# Patient Record
Sex: FEMALE | NOT HISPANIC OR LATINO | Employment: FULL TIME | URBAN - METROPOLITAN AREA
[De-identification: names, ages, dates, MRNs, and addresses within clinical notes are randomized per-mention and may not be internally consistent; named-entity substitution may affect disease eponyms.]

---

## 2023-11-21 ENCOUNTER — PHARMACY VISIT (OUTPATIENT)
Dept: PHARMACY | Facility: CLINIC | Age: 58
End: 2023-11-21
Payer: COMMERCIAL

## 2023-11-21 PROCEDURE — RXMED WILLOW AMBULATORY MEDICATION CHARGE

## 2024-01-17 ENCOUNTER — APPOINTMENT (OUTPATIENT)
Dept: RADIOLOGY | Facility: HOSPITAL | Age: 59
End: 2024-01-17
Payer: COMMERCIAL

## 2024-01-17 ENCOUNTER — HOSPITAL ENCOUNTER (EMERGENCY)
Facility: HOSPITAL | Age: 59
Discharge: HOME | End: 2024-01-17
Payer: COMMERCIAL

## 2024-01-17 VITALS
TEMPERATURE: 97.9 F | DIASTOLIC BLOOD PRESSURE: 88 MMHG | WEIGHT: 200 LBS | SYSTOLIC BLOOD PRESSURE: 151 MMHG | RESPIRATION RATE: 16 BRPM | HEIGHT: 61 IN | BODY MASS INDEX: 37.76 KG/M2 | OXYGEN SATURATION: 99 % | HEART RATE: 82 BPM

## 2024-01-17 DIAGNOSIS — Z76.0 MEDICATION REFILL: ICD-10-CM

## 2024-01-17 DIAGNOSIS — M54.50 ACUTE MIDLINE LOW BACK PAIN WITHOUT SCIATICA: Primary | ICD-10-CM

## 2024-01-17 PROBLEM — M47.812 SPONDYLOSIS OF CERVICAL SPINE: Status: ACTIVE | Noted: 2017-06-14

## 2024-01-17 PROBLEM — K58.9 IRRITABLE BOWEL SYNDROME: Status: ACTIVE | Noted: 2024-01-17

## 2024-01-17 PROBLEM — Z98.1 S/P LUMBAR SPINAL FUSION: Status: ACTIVE | Noted: 2019-07-17

## 2024-01-17 PROBLEM — H25.11 NUCLEAR SENILE CATARACT OF RIGHT EYE: Status: ACTIVE | Noted: 2022-05-10

## 2024-01-17 PROBLEM — E11.9 DM TYPE 2 (DIABETES MELLITUS, TYPE 2) (MULTI): Status: ACTIVE | Noted: 2024-01-17

## 2024-01-17 PROBLEM — F90.0 ATTENTION DEFICIT HYPERACTIVITY DISORDER (ADHD), PREDOMINANTLY INATTENTIVE TYPE: Status: ACTIVE | Noted: 2023-07-13

## 2024-01-17 PROBLEM — K51.90 ULCERATIVE COLITIS (MULTI): Status: ACTIVE | Noted: 2024-01-17

## 2024-01-17 PROBLEM — I10 ESSENTIAL HYPERTENSION: Status: ACTIVE | Noted: 2024-01-17

## 2024-01-17 PROCEDURE — 72131 CT LUMBAR SPINE W/O DYE: CPT

## 2024-01-17 PROCEDURE — 96372 THER/PROPH/DIAG INJ SC/IM: CPT

## 2024-01-17 PROCEDURE — 2500000004 HC RX 250 GENERAL PHARMACY W/ HCPCS (ALT 636 FOR OP/ED): Performed by: PHYSICIAN ASSISTANT

## 2024-01-17 PROCEDURE — 72131 CT LUMBAR SPINE W/O DYE: CPT | Performed by: RADIOLOGY

## 2024-01-17 PROCEDURE — 99284 EMERGENCY DEPT VISIT MOD MDM: CPT

## 2024-01-17 PROCEDURE — 2500000001 HC RX 250 WO HCPCS SELF ADMINISTERED DRUGS (ALT 637 FOR MEDICARE OP): Performed by: PHYSICIAN ASSISTANT

## 2024-01-17 RX ORDER — CYCLOBENZAPRINE HCL 10 MG
10 TABLET ORAL EVERY 8 HOURS PRN
COMMUNITY
Start: 2023-08-09 | End: 2024-01-17 | Stop reason: SDUPTHER

## 2024-01-17 RX ORDER — ESTRADIOL 0.1 MG/G
1 CREAM VAGINAL
COMMUNITY
Start: 2022-04-25

## 2024-01-17 RX ORDER — HYDROCODONE BITARTRATE AND ACETAMINOPHEN 5; 325 MG/1; MG/1
1 TABLET ORAL ONCE
Status: COMPLETED | OUTPATIENT
Start: 2024-01-17 | End: 2024-01-17

## 2024-01-17 RX ORDER — LEVOTHYROXINE SODIUM 150 UG/1
150 TABLET ORAL
Qty: 30 TABLET | Refills: 0 | Status: SHIPPED | OUTPATIENT
Start: 2024-01-17

## 2024-01-17 RX ORDER — FOLIC ACID 1 MG/1
1 TABLET ORAL
COMMUNITY
Start: 2023-07-05

## 2024-01-17 RX ORDER — DEXTROAMPHETAMINE SACCHARATE, AMPHETAMINE ASPARTATE, DEXTROAMPHETAMINE SULFATE AND AMPHETAMINE SULFATE 3.75; 3.75; 3.75; 3.75 MG/1; MG/1; MG/1; MG/1
15 TABLET ORAL 2 TIMES DAILY
COMMUNITY
Start: 2024-01-16 | End: 2024-02-15

## 2024-01-17 RX ORDER — CYCLOBENZAPRINE HCL 10 MG
10 TABLET ORAL 3 TIMES DAILY PRN
Qty: 15 TABLET | Refills: 0 | Status: SHIPPED | OUTPATIENT
Start: 2024-01-17

## 2024-01-17 RX ORDER — HYDROCODONE BITARTRATE AND ACETAMINOPHEN 5; 325 MG/1; MG/1
1 TABLET ORAL EVERY 6 HOURS PRN
Qty: 10 TABLET | Refills: 0 | Status: SHIPPED | OUTPATIENT
Start: 2024-01-17

## 2024-01-17 RX ORDER — OMEPRAZOLE 20 MG/1
20 CAPSULE, DELAYED RELEASE ORAL
COMMUNITY
Start: 2023-08-09

## 2024-01-17 RX ORDER — KETOROLAC TROMETHAMINE 30 MG/ML
30 INJECTION, SOLUTION INTRAMUSCULAR; INTRAVENOUS ONCE
Status: COMPLETED | OUTPATIENT
Start: 2024-01-17 | End: 2024-01-17

## 2024-01-17 RX ORDER — PREDNISONE 5 MG/1
5 TABLET ORAL
COMMUNITY
Start: 2024-01-16 | End: 2024-01-17 | Stop reason: SDUPTHER

## 2024-01-17 RX ORDER — FUROSEMIDE 40 MG/1
40 TABLET ORAL
COMMUNITY
Start: 2023-08-09

## 2024-01-17 RX ORDER — DIFLUPREDNATE OPHTHALMIC 0.5 MG/ML
1 EMULSION OPHTHALMIC 4 TIMES DAILY
COMMUNITY
Start: 2023-08-09

## 2024-01-17 RX ORDER — IBUPROFEN 800 MG/1
800 TABLET ORAL EVERY 8 HOURS PRN
COMMUNITY
Start: 2023-08-09

## 2024-01-17 RX ORDER — METFORMIN HYDROCHLORIDE 1000 MG/1
1 TABLET ORAL
COMMUNITY
Start: 2023-08-09

## 2024-01-17 RX ORDER — LISINOPRIL 20 MG/1
20 TABLET ORAL
COMMUNITY
Start: 2023-08-09

## 2024-01-17 RX ORDER — KETOROLAC TROMETHAMINE 30 MG/ML
INJECTION, SOLUTION INTRAMUSCULAR; INTRAVENOUS
Status: DISCONTINUED
Start: 2024-01-17 | End: 2024-01-17 | Stop reason: HOSPADM

## 2024-01-17 RX ORDER — PREDNISONE 20 MG/1
20 TABLET ORAL ONCE
Status: COMPLETED | OUTPATIENT
Start: 2024-01-17 | End: 2024-01-17

## 2024-01-17 RX ORDER — LEVOTHYROXINE SODIUM 137 UG/1
137 TABLET ORAL
COMMUNITY
Start: 2023-08-09 | End: 2024-01-17 | Stop reason: SDUPTHER

## 2024-01-17 RX ORDER — GABAPENTIN 800 MG/1
800 TABLET ORAL 3 TIMES DAILY
COMMUNITY
Start: 2023-08-09 | End: 2024-02-05

## 2024-01-17 RX ORDER — PREDNISONE 20 MG/1
20 TABLET ORAL DAILY
Qty: 4 TABLET | Refills: 0 | Status: SHIPPED | OUTPATIENT
Start: 2024-01-17

## 2024-01-17 RX ORDER — PREDNISONE 5 MG/1
5 TABLET ORAL
Qty: 30 TABLET | Refills: 0 | Status: SHIPPED | OUTPATIENT
Start: 2024-01-17

## 2024-01-17 RX ADMIN — KETOROLAC TROMETHAMINE 30 MG: 30 INJECTION INTRAMUSCULAR; INTRAVENOUS at 10:30

## 2024-01-17 RX ADMIN — PREDNISONE 20 MG: 20 TABLET ORAL at 12:48

## 2024-01-17 RX ADMIN — HYDROCODONE BITARTRATE AND ACETAMINOPHEN 1 TABLET: 5; 325 TABLET ORAL at 15:30

## 2024-01-17 ASSESSMENT — PAIN DESCRIPTION - ORIENTATION: ORIENTATION: LOWER

## 2024-01-17 ASSESSMENT — COLUMBIA-SUICIDE SEVERITY RATING SCALE - C-SSRS
1. IN THE PAST MONTH, HAVE YOU WISHED YOU WERE DEAD OR WISHED YOU COULD GO TO SLEEP AND NOT WAKE UP?: NO
6. HAVE YOU EVER DONE ANYTHING, STARTED TO DO ANYTHING, OR PREPARED TO DO ANYTHING TO END YOUR LIFE?: NO
2. HAVE YOU ACTUALLY HAD ANY THOUGHTS OF KILLING YOURSELF?: NO

## 2024-01-17 ASSESSMENT — PAIN DESCRIPTION - LOCATION: LOCATION: BACK

## 2024-01-17 ASSESSMENT — PAIN - FUNCTIONAL ASSESSMENT
PAIN_FUNCTIONAL_ASSESSMENT: 0-10
PAIN_FUNCTIONAL_ASSESSMENT: 0-10

## 2024-01-17 ASSESSMENT — PAIN SCALES - GENERAL
PAINLEVEL_OUTOF10: 8
PAINLEVEL_OUTOF10: 2

## 2024-01-17 NOTE — ED PROVIDER NOTES
"Chief Complaint   Patient presents with    Back Pain     HPI:   Ashley Martinez is an 58 y.o. female complicated PMH that includes ADHD, T2DM, HTN, IBS, spondylolysis of cervical spine, spinal fusion (2019) who presents to the ED for evaluation of back pain x 5 days.  She localizes it to her bilateral low back around the level of L5-S1.  Radiates down both legs.  She denies trauma or injury, abnormal bending, lifting or twisting.  She rates the pain 8/10.  Aggravated by movement.  Relieved by Motrin and by using a walker and holding weight on the walker.  Took 600 mg of ibuprofen at 0630.  She says it \"feels like quick electric jabs\".  She says it similar to when she had sciatica but she has never had sciatica down both legs before.  She endorses slight tingling sensation.  Denies any numbness, loss of sensation, weakness, loss of bowel or bladder, urinary retention, saddle anesthesia, fevers, history of IVDA.  She is diabetic.  Last time she had back surgery was in 2019.  Has not had any recent injections.  She denies any upper back pain, leg pain.  Patient does note that she takes 5 mg prednisone daily and has been out of it for the past few days.  She is not sure if that is causing exacerbation.    Medications: Adderall, Flexeril, folic acid, Lasix, gabapentin, Synthroid, lisinopril, metformin, omeprazole, prednisone, Zofran  Soc HX: works for [employer]   Allergies   Allergen Reactions    Penicillins Anaphylaxis    Codeine Nausea/vomiting   :  History reviewed. No pertinent past medical history.  History reviewed. No pertinent surgical history.  No family history on file.     Physical Exam  Vitals and nursing note reviewed.   Constitutional:       General: She is not in acute distress.     Appearance: She is not ill-appearing or toxic-appearing.      Comments: Pleasant.  Elevated BMI   HENT:      Head: Normocephalic and atraumatic.      Right Ear: External ear normal.      Left Ear: External ear normal.   Eyes: "      Pupils: Pupils are equal, round, and reactive to light.   Cardiovascular:      Rate and Rhythm: Normal rate and regular rhythm.      Pulses: Normal pulses.      Heart sounds: No murmur heard.  Pulmonary:      Effort: Pulmonary effort is normal. No respiratory distress.      Breath sounds: Normal breath sounds.   Abdominal:      General: Bowel sounds are normal.      Palpations: Abdomen is soft.      Tenderness: There is no abdominal tenderness. There is no guarding or rebound.   Musculoskeletal:         General: No deformity or signs of injury. Normal range of motion.      Cervical back: Normal range of motion. No tenderness.      Comments: 5/5 strength bilateral lower extremities   Skin:     General: Skin is warm and dry.      Capillary Refill: Capillary refill takes less than 2 seconds.      Findings: No bruising or rash.   Neurological:      General: No focal deficit present.      Mental Status: She is alert.      Cranial Nerves: No cranial nerve deficit.      Sensory: No sensory deficit.      Coordination: Coordination normal.      Gait: Gait abnormal (Ambulating with walker).      Deep Tendon Reflexes: Reflexes normal.      Comments: Negative straight leg raise bilaterally     VS: As documented in the triage note and EMR flowsheet from this visit were reviewed.    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      Medical Decision Making:   ED Course as of 01/17/24 1713   Wed Jan 17, 2024   1017 Vitals Reviewed: Afebrile. Hypertensive. Not tachycardic nor tachypneic. No hypoxia.   [KA]   1129 Patient is well-appearing 58-year-old female who presents to the ED for evaluation of atraumatic low back pain.  She is walking with a walker which she says is new for her.   She has normal high-sensitivity neuroexam aside from rectal tone which was not assessed.  Do not suspect cauda equina or spinal epidural abscess that would necessitate urgent MRI imaging.  Patient requesting a shot of Toradol.  Will provide Toradol and 20 mg prednisone.  Will undergo CT of the lumbar spine without contrast.  She is concerned hardware might be loose.  She does have point tenderness around the paraspinal musculature around the level of L5-S1. [KA]   1136 RN called CT to inquire about reason for delay. No answer [KA]   1202 Called CT x 2 to inquire as to reason for delay. No answer. [KA]   1250 RN notified me that one CT is down and they have limited staffing. Issue has been escalated to managment [KA]   1345 Patient updated on reason for delay.  There are for patients ahead of her.  She is aware of the wait time.  She does state pain has improved.  She is comfortable waiting until CT is available. [KA]   1515 Paged Neuro surg [KA]   1522 Discussed CT findings with patient.  She says that she does feel better following the prednisone.  Toradol helped but not as much is prednisone.  She still complaining of pain.  Would like a Vicodin.  Will order this for her.  I did advise her that I spoke page neurosurgery.  It is unlikely that they will recommend MRI imaging today however I did tell her that is the reason I paged them. [KA]   1539 Repaged neuro [KA]   1640 3rd page neurosurg   [KA]   1707 Patient reports feeling better following [place] administration.  We have not yet heard back from neurosurgery.  I had shared decision-making conversation with the patient and she is agreeable to be discharged.  I have placed consult order for neurosurgery so the patient could be scheduled for neurosurgical follow-up.  We discussed strict return precautions including any loss of bowel or bladder, urinary retention, saddle anesthesia, any changes in pain.  Patient says she will return immediately to the ER should she  notice anything.  Reviewed OARRS.  No concerning findings.  Will send a couple of days of [place] to the pharmacy.  Have advised that she should not take this with Flexeril as it can decrease respiratory drive.  She is agreeable. [KA]      ED Course User Index  [KA] Gely Alvarado PA-C         Diagnoses as of 01/17/24 1713   Acute midline low back pain without sciatica   Medication refill      Escalation of Care: Appropriate for outpatient management     Prescription Drug Consideration: One-time courtesy refill prednisone, Synthroid and Flexeril      Discussion of Management with Other Providers:  I discussed the patient/results with: Attending Angelo, although he did not personally evaluate patient himself.    Counseling: Spoke with the patient and discussed today´s findings, in addition to providing specific details for the plan of care and expected course.  Patient was given the opportunity to ask questions.    Discussed return precautions and importance of follow-up.  Advised to follow-up with orthopedics/spine.  Advised to return to the ED for changing or worsening symptoms, new symptoms, complaint specific precautions, and precautions listed on the discharge paperwork.  Educated on the common potential side effects of medications prescribed.    I advised the patient that the emergency evaluation and treatment provided today doesn't end their need for medical care. It is very important that they follow-up with their primary care provider or other specialist as instructed.    The plan of care was mutually agreed upon with the patient. The patient and/or family were given the opportunity to ask questions. All questions asked today in the ED were answered to the best of my ability with today's information.    I specifically advised the patient to return to the ED for changing or worsening symptoms, worrisome new symptoms, or for any complaint specific precautions listed on the discharge paperwork.    This patient  was cared for in the setting of nationwide stress on resources and staffing.    This report was transcribed using voice recognition software.  Every effort was made to ensure accuracy, however, inadvertently computerized transcription errors may be present.       Gely Alvarado PA-C  01/17/24 3685

## 2024-01-17 NOTE — DISCHARGE INSTRUCTIONS
Please follow-up with neurosurgery.  Follow-up with primary care provider in the next couple of days.  Continue supportive care including rest, hydration, NSAIDs, Tylenol, heat and or ice.  Return to nearest ER for any new or worsening symptoms including incontinence, loss of sensation, numbness or tingling or anything else concerning to you.

## 2024-01-17 NOTE — ED TRIAGE NOTES
"C/O LOWER BACK PAIN RADIATING INTO BLE, PT STATES \"I HAD BACK SURGERY 2019 L2-S1, I DON'T KNOW IF A SCREW HAS MOVED OR NOT\" DENIES KNOWN INJURY, DENIES CP/SOB/PALPITATIONS/N/V/D/F/CHILLS, AMBULATED TO TRIAGE WITH USE OF WALKER, PT STATES \"I DO NOT NORMALLY USE A WALKER, BUT I HAD TO TODAY\" DENIES DYSURIA/HEMATURIA/HEMATOCHEZIA, ONSET 5 DAYS   "

## 2024-01-23 ENCOUNTER — OFFICE VISIT (OUTPATIENT)
Dept: NEUROSURGERY | Facility: CLINIC | Age: 59
End: 2024-01-23
Payer: COMMERCIAL

## 2024-01-23 VITALS
SYSTOLIC BLOOD PRESSURE: 131 MMHG | BODY MASS INDEX: 39.27 KG/M2 | HEIGHT: 60 IN | DIASTOLIC BLOOD PRESSURE: 84 MMHG | HEART RATE: 101 BPM | TEMPERATURE: 95.5 F | WEIGHT: 200 LBS

## 2024-01-23 DIAGNOSIS — M54.9 BACK PAIN, UNSPECIFIED BACK LOCATION, UNSPECIFIED BACK PAIN LATERALITY, UNSPECIFIED CHRONICITY: Primary | ICD-10-CM

## 2024-01-23 PROCEDURE — 3075F SYST BP GE 130 - 139MM HG: CPT | Performed by: NEUROLOGICAL SURGERY

## 2024-01-23 PROCEDURE — 4010F ACE/ARB THERAPY RXD/TAKEN: CPT | Performed by: NEUROLOGICAL SURGERY

## 2024-01-23 PROCEDURE — 3079F DIAST BP 80-89 MM HG: CPT | Performed by: NEUROLOGICAL SURGERY

## 2024-01-23 PROCEDURE — 99213 OFFICE O/P EST LOW 20 MIN: CPT | Mod: GC | Performed by: NEUROLOGICAL SURGERY

## 2024-01-23 PROCEDURE — 99203 OFFICE O/P NEW LOW 30 MIN: CPT | Performed by: NEUROLOGICAL SURGERY

## 2024-01-23 ASSESSMENT — PAIN SCALES - GENERAL: PAINLEVEL: 6

## 2024-01-23 NOTE — PROGRESS NOTES
It was a pleasure to see Ms. Martinez at the Neurosurgery Spine Clinic at [facility].     Patient is a 58-year-old female who underwent a prior anterior cervical corpectomy and L4-S1 fusion at the [facility] who presents with 1 week of tailbone pain complaints with no radicular issues in her legs.  She reports no significant trauma.  She states that she had very good relief from her issues after her prior surgery at the [facility].  She denies any bowel or bladder issues.    The pain is 7/10. The pain is described as pinching, sharp, and stabbing and occurs all day.  Symptoms are exacerbated by walking for more than 5 minutes. Factors which relieve the pain include NSAIDs and rest      Numbness and/or tingling - NO    Weakness : YES BOTH LEGS    Bladder/Bowel symptoms - NO    The patient has tried medications (eg: gabapentin, NSAIDS and narcotics ) : Yes  PT : No  Pain Management with ESIs/selective nerve blocks  - NO    she is a NON-SMOKER and DIABETIC    History of Depression : NO    PRIOR SPINE SURGERY: YES    Denies use of Aspirin, Coumadin, or Plavix or any other anticoagulants NO    NARCOTICS for pain : YES VICODEN PRESCRIPTION GIVEN IN ER    Part of this patient’s history is from personal review of the patient’s previous charts.      No past medical history on file.        Current Outpatient Medications:     amphetamine-dextroamphetamine (Adderall) 15 mg tablet, Take 1 tablet (15 mg) by mouth twice a day., Disp: , Rfl:     cyclobenzaprine (Flexeril) 10 mg tablet, Take 1 tablet (10 mg) by mouth 3 times a day as needed for muscle spasms., Disp: 15 tablet, Rfl: 0    difluprednate (Durezol) 0.05 % ophthalmic solution, Administer 1 drop into affected eye(s) 4 times a day., Disp: , Rfl:     dulaglutide (TRULICITY) 4.5 mg/0.5 mL pen injector, INJECT [4.5] MG UNDER THE SKIN ONCE WEEKLY, Disp: 6 mL, Rfl: 3    estradiol (Estrace) 0.01 % (0.1 mg/gram) vaginal cream, Insert 0.25 Applicatorfuls (1 g)  into the vagina., Disp: , Rfl:     folic acid (Folvite) 1 mg tablet, Take 1 tablet (1 mg) by mouth once daily., Disp: , Rfl:     furosemide (Lasix) 40 mg tablet, Take 1 tablet (40 mg) by mouth once daily., Disp: , Rfl:     gabapentin (Neurontin) 800 mg tablet, Take 1 tablet (800 mg) by mouth 3 times a day., Disp: , Rfl:     HYDROcodone-acetaminophen (Norco) 5-325 mg tablet, Take 1 tablet by mouth every 6 hours if needed for severe pain (7 - 10)., Disp: 10 tablet, Rfl: 0    ibuprofen 800 mg tablet, Take 1 tablet (800 mg) by mouth every 8 hours if needed., Disp: , Rfl:     levothyroxine (Synthroid, Levoxyl) 150 mcg tablet, Take 1 tablet (150 mcg) by mouth once daily., Disp: 30 tablet, Rfl: 0    lisinopril 20 mg tablet, Take 1 tablet (20 mg) by mouth once daily., Disp: , Rfl:     metFORMIN (Glucophage) 1,000 mg tablet, Take 1 tablet (1,000 mg) by mouth 2 times a day with meals., Disp: , Rfl:     omeprazole (PriLOSEC) 20 mg DR capsule, Take 1 capsule (20 mg) by mouth once daily., Disp: , Rfl:     predniSONE (Deltasone) 20 mg tablet, Take 1 tablet (20 mg) by mouth once daily., Disp: 4 tablet, Rfl: 0    predniSONE (Deltasone) 5 mg tablet, Take 1 tablet (5 mg) by mouth once daily. Begin day after completing 20 mg burst, Disp: 30 tablet, Rfl: 0      Review of Systems :   Constitutional: No fever or chills  Respiratory: No shortness of breath or wheezing  Musculoskeletal: see HPI above   Neurologic: See HPI above        EXAM:   Vitals:    01/23/24 1443   BP: 131/84   Pulse: 101   Temp: 35.3 °C (95.5 °F)   General: Well developed, awake/alert/oriented x3, no distress, alert and cooperative    Skin: Warm and dry, no lesions, no rashes    ENMT: Mucous membranes moist, no apparent injury, no lesions seen    Head/Neck: Neck Supple, no apparent injury    Respiratory/Thorax: Normal breath sounds with good chest expansion, thorax symmetric    Cardiovascular: No pitting edema, no JVD    Motor Strength: 5/5 Throughout all  extremities    Muscle Bulk: Normal and symmetric in all extremities    Posture:  -- Cervical: Normal  -- Thoracic: Normal  -- Lumbar : Normal  Paraspinal muscle spasm/tenderness absent.    Sensation: SILT        IMAGING:   Imaging was personally reviewed and shows CT of the lumbar spine with hardware from L4-S1 along with cage placement at the level of L4-L5 with no hardware loosening significantly seen.    ASSESSMENT AND PLAN:  Patient is a 58-year-old female with prior L4-S1 lumbar fusion at the [facility] who presents to see us with tailbone pain complaints.  At this point patient does not have any radiographic signs of hardware loosening.  Will recommend patient to see physical therapy and pain management for her complaints.  All questions were answered.  Patient does not need to follow-up with us.        Uriel Stockton MD, [facility]   of Neurological Surgery  [employer] [facility] [employer]  Attending Surgeon  Director - Minimally Invasive Spine Surgery  [place], [place]      Some of this note was completed using Dragon voice recognition technology and sometimes the software misinterprets words. This may include unintended errors with respect to translation of words, typographical errors or grammar errors which may not have been identified prior to finalization of the chart note. Please take this into account when reading this note

## 2024-01-24 ENCOUNTER — APPOINTMENT (OUTPATIENT)
Dept: NEUROSURGERY | Facility: CLINIC | Age: 59
End: 2024-01-24
Payer: COMMERCIAL

## 2024-02-14 ENCOUNTER — APPOINTMENT (OUTPATIENT)
Dept: PHYSICAL THERAPY | Facility: CLINIC | Age: 59
End: 2024-02-14
Payer: COMMERCIAL

## 2024-02-20 ENCOUNTER — OFFICE VISIT (OUTPATIENT)
Dept: PAIN MEDICINE | Facility: CLINIC | Age: 59
End: 2024-02-20
Payer: COMMERCIAL

## 2024-02-20 VITALS
SYSTOLIC BLOOD PRESSURE: 125 MMHG | HEART RATE: 76 BPM | WEIGHT: 200 LBS | HEIGHT: 60 IN | DIASTOLIC BLOOD PRESSURE: 69 MMHG | BODY MASS INDEX: 39.27 KG/M2 | RESPIRATION RATE: 16 BRPM

## 2024-02-20 DIAGNOSIS — M53.3 COCCYDYNIA: Primary | ICD-10-CM

## 2024-02-20 DIAGNOSIS — M54.9 BACK PAIN, UNSPECIFIED BACK LOCATION, UNSPECIFIED BACK PAIN LATERALITY, UNSPECIFIED CHRONICITY: ICD-10-CM

## 2024-02-20 PROCEDURE — 99204 OFFICE O/P NEW MOD 45 MIN: CPT | Performed by: PHYSICAL MEDICINE & REHABILITATION

## 2024-02-20 PROCEDURE — 3078F DIAST BP <80 MM HG: CPT | Performed by: PHYSICAL MEDICINE & REHABILITATION

## 2024-02-20 PROCEDURE — 4010F ACE/ARB THERAPY RXD/TAKEN: CPT | Performed by: PHYSICAL MEDICINE & REHABILITATION

## 2024-02-20 PROCEDURE — 3074F SYST BP LT 130 MM HG: CPT | Performed by: PHYSICAL MEDICINE & REHABILITATION

## 2024-02-20 ASSESSMENT — PAIN SCALES - GENERAL: PAINLEVEL: 5

## 2024-02-20 NOTE — H&P (VIEW-ONLY)
Subjective   Patient ID: Ashley Martinez is a 58 y.o. female who presents for Tailbone Pain.  HPI    Pleasant 58-year-old female with history of L4-S1 fusion as well as cervical fusion who underwent a fall about a year and a half ago where she fractured her fibula.  The last several weeks to month or so she has now had some tailbone pain pointed right at her coccyx.  She is unable to sit down and tolerate any type of exercise right now.  She does have to shift when she is sitting down and tries to stand is much as he possibly can.  Was seen in the ER and given a Toradol shot which helped for a few hours.  Is taking ibuprofen as well as gabapentin and Flexeril.  Denies any bowel or bladder changes, denies any lower extremity weakness with the exception of some weakness with pain.  No pain with bowel movement              Oswestry disabiliy index score: 36%      Monitoring and compliance:    ORT:    PDUQ:    Office Agreement:      Review of Systems   All other systems reviewed and are negative.       Current Outpatient Medications   Medication Instructions    amphetamine-dextroamphetamine (Adderall) 15 mg tablet 15 mg, oral, 2 times daily    cyclobenzaprine (FLEXERIL) 10 mg, oral, 3 times daily PRN    difluprednate (Durezol) 0.05 % ophthalmic solution 1 drop, ophthalmic (eye), 4 times daily    dulaglutide (TRULICITY) 4.5 mg/0.5 mL pen injector INJECT [4.5] MG UNDER THE SKIN ONCE WEEKLY    estradiol (ESTRACE) 1 g, vaginal    folic acid (FOLVITE) 1 mg, oral, Daily RT    furosemide (LASIX) 40 mg, oral, Daily RT    gabapentin (NEURONTIN) 800 mg, oral, 3 times daily    HYDROcodone-acetaminophen (Norco) 5-325 mg tablet 1 tablet, oral, Every 6 hours PRN    ibuprofen 800 mg, oral, Every 8 hours PRN    levothyroxine (SYNTHROID, LEVOXYL) 150 mcg, oral, Daily RT    lisinopril 20 mg, oral, Daily RT    metFORMIN (Glucophage) 1,000 mg tablet 1 tablet, oral, 2 times daily with meals    omeprazole (PRILOSEC) 20 mg, oral, Daily RT     predniSONE (DELTASONE) 5 mg, oral, Daily RT, Begin day after completing 20 mg burst    predniSONE (DELTASONE) 20 mg, oral, Daily        No past medical history on file.     No past surgical history on file.     No family history on file.     Allergies   Allergen Reactions    Penicillins Anaphylaxis    Codeine Nausea/vomiting        Objective     Vitals:    02/20/24 0809   BP: 125/69   Pulse: 76   Resp: 16        Physical Exam    GENERAL EXAM  Vital Signs: Vital signs to include heart rate, respiration rate, blood pressure, and temperature were reviewed.  General Appearance:  Awake, alert, healthy appearing, well developed, No acute distress.  Head: Normocephalic without evidence of head injury.  Neck: The appearance of the neck was normal without swelling with a midline trachea.  Eyes: The eyelids and eyebrows exhibited no abnormalities.  Pupils were not pin-point.  Sclera was without icterus.  Lungs: Respiration rhythm and depth was normal.  Respiratory movements were normal without labored breathing.  Cardiovascular: No peripheral edema was present.    Neurological: Patient was oriented to time, place, and person.  Speech was normal.  Balance, gait, and stance were unremarkable.    Psychiatric: Appearance was normal with appropriate dress.  Mood was euthymic and affect was normal.  Skin: Affected regions were without ecchymosis or skin lesions.    Lower Extremity (MSK/neuro/skin):  Full active and passive range of motion in all planes  Strength 5 out of 5 bilateral lower extremities  Sensation to light-touch grossly intact bilateral lower extremities  Deep tendon reflexes equal bilateral lower extremities  No edema/effusion/erythema  Skin: no skin lesions or scars    Physical exam as above except:  Decreased sensation to light touch over bilateral lower extremities in a stocking distribution to about mid calf.  Tenderness to palpation mildly over bilateral SI joints and lumbar paraspinals but exquisite tenderness  to palpation over coccyx.      Assessment/Plan   Diagnoses and all orders for this visit:  Coccydynia  -     Referral to Physical Therapy; Future  -     XR sacrum coccyx 2+ views; Future  -     Sympathetic Block; Future  Back pain, unspecified back location, unspecified back pain laterality, unspecified chronicity  -     Referral to Pain Medicine    58-year-old female with coccyx pain in the setting of prior L4-S1 fusion and fall about a year ago.  She has been unable to tolerate any type of activity.  At this point I think it is medically necessary to perform an injection to try to decrease the acute to subacute pain that she is having to be able to tolerate some good pelvic floor physical therapy.  Her symptoms are consistent with coccydynia, cannot rule out any type of anterior displacement as the CT scan of her lumbar spine does not go all the way down to her coccyx.  Will also obtain some x-rays of her coccyx.  Our plan for today:  - X-ray of coccyx.  - We will schedule patient for ganglion impar block.  Discussed risk benefits and alternatives and patient would like to proceed.  - Continue with current medications of gabapentin, ibuprofen and Flexeril.  - Will refer patient to physical therapy specifically pelvic floor physical therapy to hopefully help with the ganglion impar block last longer        This note was generated with the aid of dictation software, there may be typos despite my attempts at proofreading.

## 2024-03-01 ENCOUNTER — HOSPITAL ENCOUNTER (OUTPATIENT)
Dept: RADIOLOGY | Facility: HOSPITAL | Age: 59
Discharge: HOME | End: 2024-03-01
Payer: COMMERCIAL

## 2024-03-01 DIAGNOSIS — M53.3 COCCYDYNIA: ICD-10-CM

## 2024-03-01 PROCEDURE — 72220 X-RAY EXAM SACRUM TAILBONE: CPT

## 2024-03-01 PROCEDURE — 72220 X-RAY EXAM SACRUM TAILBONE: CPT | Performed by: RADIOLOGY

## 2024-03-06 ENCOUNTER — HOSPITAL ENCOUNTER (OUTPATIENT)
Dept: OPERATING ROOM | Facility: CLINIC | Age: 59
Setting detail: OUTPATIENT SURGERY
Discharge: HOME | End: 2024-03-06
Payer: COMMERCIAL

## 2024-03-06 ENCOUNTER — HOSPITAL ENCOUNTER (OUTPATIENT)
Dept: RADIOLOGY | Facility: CLINIC | Age: 59
Setting detail: OUTPATIENT SURGERY
Discharge: HOME | End: 2024-03-06
Payer: COMMERCIAL

## 2024-03-06 VITALS
DIASTOLIC BLOOD PRESSURE: 62 MMHG | HEART RATE: 88 BPM | HEIGHT: 60 IN | SYSTOLIC BLOOD PRESSURE: 141 MMHG | RESPIRATION RATE: 17 BRPM | TEMPERATURE: 97.2 F | OXYGEN SATURATION: 97 % | BODY MASS INDEX: 41.25 KG/M2 | WEIGHT: 210.1 LBS

## 2024-03-06 DIAGNOSIS — M53.3 COCCYDYNIA: ICD-10-CM

## 2024-03-06 LAB — GLUCOSE BLD MANUAL STRIP-MCNC: 232 MG/DL (ref 74–99)

## 2024-03-06 PROCEDURE — 2500000002 HC RX 250 W HCPCS SELF ADMINISTERED DRUGS (ALT 637 FOR MEDICARE OP, ALT 636 FOR OP/ED): Performed by: PHYSICAL MEDICINE & REHABILITATION

## 2024-03-06 PROCEDURE — 2500000005 HC RX 250 GENERAL PHARMACY W/O HCPCS: Performed by: PHYSICAL MEDICINE & REHABILITATION

## 2024-03-06 PROCEDURE — 2550000001 HC RX 255 CONTRASTS: Performed by: PHYSICAL MEDICINE & REHABILITATION

## 2024-03-06 PROCEDURE — 64999 UNLISTED PX NERVOUS SYSTEM: CPT | Performed by: PHYSICAL MEDICINE & REHABILITATION

## 2024-03-06 PROCEDURE — 82947 ASSAY GLUCOSE BLOOD QUANT: CPT

## 2024-03-06 PROCEDURE — 2500000004 HC RX 250 GENERAL PHARMACY W/ HCPCS (ALT 636 FOR OP/ED): Performed by: PHYSICAL MEDICINE & REHABILITATION

## 2024-03-06 RX ORDER — LIDOCAINE HYDROCHLORIDE 5 MG/ML
INJECTION, SOLUTION INFILTRATION; PERINEURAL AS NEEDED
Status: COMPLETED | OUTPATIENT
Start: 2024-03-06 | End: 2024-03-06

## 2024-03-06 RX ORDER — ROPIVACAINE HYDROCHLORIDE 5 MG/ML
INJECTION, SOLUTION EPIDURAL; INFILTRATION; PERINEURAL AS NEEDED
Status: COMPLETED | OUTPATIENT
Start: 2024-03-06 | End: 2024-03-06

## 2024-03-06 RX ORDER — METHYLPREDNISOLONE ACETATE 40 MG/ML
INJECTION, SUSPENSION INTRA-ARTICULAR; INTRALESIONAL; INTRAMUSCULAR; SOFT TISSUE AS NEEDED
Status: COMPLETED | OUTPATIENT
Start: 2024-03-06 | End: 2024-03-06

## 2024-03-06 RX ORDER — DIAZEPAM 5 MG/1
5 TABLET ORAL ONCE
Status: COMPLETED | OUTPATIENT
Start: 2024-03-06 | End: 2024-03-06

## 2024-03-06 RX ADMIN — METHYLPREDNISOLONE ACETATE 40 MG: 40 INJECTION, SUSPENSION INTRA-ARTICULAR; INTRALESIONAL; INTRAMUSCULAR; SOFT TISSUE at 10:18

## 2024-03-06 RX ADMIN — ROPIVACAINE HYDROCHLORIDE 10 ML: 5 INJECTION, SOLUTION EPIDURAL; INFILTRATION; PERINEURAL at 10:17

## 2024-03-06 RX ADMIN — DIAZEPAM 5 MG: 5 TABLET ORAL at 10:03

## 2024-03-06 RX ADMIN — LIDOCAINE HYDROCHLORIDE 5 ML: 5 INJECTION, SOLUTION INFILTRATION; PERINEURAL at 10:17

## 2024-03-06 RX ADMIN — IOHEXOL 1 ML: 240 INJECTION, SOLUTION INTRATHECAL; INTRAVASCULAR; INTRAVENOUS; ORAL at 10:18

## 2024-03-06 ASSESSMENT — PAIN - FUNCTIONAL ASSESSMENT
PAIN_FUNCTIONAL_ASSESSMENT: 0-10
PAIN_FUNCTIONAL_ASSESSMENT: 0-10

## 2024-03-06 ASSESSMENT — COLUMBIA-SUICIDE SEVERITY RATING SCALE - C-SSRS
6. HAVE YOU EVER DONE ANYTHING, STARTED TO DO ANYTHING, OR PREPARED TO DO ANYTHING TO END YOUR LIFE?: NO
1. IN THE PAST MONTH, HAVE YOU WISHED YOU WERE DEAD OR WISHED YOU COULD GO TO SLEEP AND NOT WAKE UP?: NO
2. HAVE YOU ACTUALLY HAD ANY THOUGHTS OF KILLING YOURSELF?: NO

## 2024-03-06 ASSESSMENT — PAIN SCALES - GENERAL
PAINLEVEL_OUTOF10: 0 - NO PAIN
PAINLEVEL_OUTOF10: 7

## 2024-03-06 NOTE — PROCEDURES
Nerve Block    Date/Time: 3/6/2024 10:15 AM    Performed by: Levi Mcclellan MD  Authorized by: Levi Mcclellan MD    Consent:     Consent obtained:  Written    Consent given by:  Patient    Risks, benefits, and alternatives were discussed: yes      Risks discussed:  Nerve damage, infection, allergic reaction, swelling, bleeding and pain    Alternatives discussed:  No treatment, delayed treatment and alternative treatment  Universal protocol:     Procedure explained and questions answered to patient or proxy's satisfaction: yes      Relevant documents present and verified: yes      Test results available: yes      Imaging studies available: yes      Required blood products, implants, devices, and special equipment available: yes      Site/side marked: yes      Immediately prior to procedure, a time out was called: yes      Patient identity confirmed:  Verbally with patient, hospital-assigned identification number and arm band  Comments:      Ganglion impar block    The patient was positioned comfortably into a prone position and was prepped and draped in the usual sterile manner.  Sterile precautions, including sterile gloves, disposable cap and masks were worn for the procedure at all times. Skeletal landmarks were identified under fluoroscopy. There was no evidence of infection at the site of needle insertion. A final time-out was done. The skin and subcutaneous tissue at insertion site was anesthetized with 1% lidocaine with or without sodium bicarbonate.  Under fluoroscopic guidance, the needle was passed directly through the sacrococcygeal ligament.  Placement was confirmed with radiocontrast dye under live fluoroscopy. The therapeutic mixture was then injected. The needle was restyletted and removed.  Pressure was held, and after ensuring hemostasis and the absence of hematoma, an adhesive bandage was applied to the site of needle insertion.  Preservative-free solutions were utilized in the  injectate.    Medications: [4mL 0.5% Ropivacaine] [40mg Depo-Medrol]

## 2024-03-07 ASSESSMENT — PAIN SCALES - GENERAL: PAINLEVEL_OUTOF10: 2

## 2024-03-07 NOTE — ADDENDUM NOTE
Encounter addended by: Stephie Pereira RN on: 3/7/2024 12:48 PM   Actions taken: Contacts section saved, Flowsheet accepted

## 2024-04-16 ENCOUNTER — OFFICE VISIT (OUTPATIENT)
Dept: PAIN MEDICINE | Facility: CLINIC | Age: 59
End: 2024-04-16
Payer: COMMERCIAL

## 2024-04-16 DIAGNOSIS — M53.3 COCCYDYNIA: ICD-10-CM

## 2024-04-16 DIAGNOSIS — M96.1 POSTLAMINECTOMY SYNDROME: Primary | ICD-10-CM

## 2024-04-16 PROCEDURE — 4010F ACE/ARB THERAPY RXD/TAKEN: CPT | Performed by: PHYSICAL MEDICINE & REHABILITATION

## 2024-04-16 PROCEDURE — 99214 OFFICE O/P EST MOD 30 MIN: CPT | Performed by: PHYSICAL MEDICINE & REHABILITATION

## 2024-04-16 ASSESSMENT — PAIN SCALES - GENERAL: PAINLEVEL: 8

## 2024-04-16 NOTE — PROGRESS NOTES
Subjective   Patient ID: Ashley Martinez is a 58 y.o. female who presents for Back Pain and Follow-up.  HPI    58-year-old female with coccydynia here for follow-up after ganglion impar block.  Patient notices 80 to 90% relief of her coccydynia.  Unfortunately though since the injection patient has noticed more back pain with left lower extremity radicular symptoms and weakness in her left leg where she is actually fallen a couple of times.  Continues to walk with a walker.  She does have a history of L4-S1 fusion.  This is pain in weakness are new over the last month or so.  No inciting event to these.                Monitoring and compliance:    ORT:    PDUQ:    Office Agreement:      Review of Systems     Current Outpatient Medications   Medication Instructions    amphetamine-dextroamphetamine (Adderall) 15 mg tablet 15 mg, oral, 2 times daily    cyclobenzaprine (FLEXERIL) 10 mg, oral, 3 times daily PRN    difluprednate (Durezol) 0.05 % ophthalmic solution 1 drop, ophthalmic (eye), 4 times daily    dulaglutide (TRULICITY) 4.5 mg/0.5 mL pen injector INJECT [4.5] MG UNDER THE SKIN ONCE WEEKLY    estradiol (ESTRACE) 1 g, vaginal    folic acid (FOLVITE) 1 mg, oral, Daily RT    furosemide (LASIX) 40 mg, oral, Daily RT    gabapentin (NEURONTIN) 800 mg, oral, 3 times daily    HYDROcodone-acetaminophen (Norco) 5-325 mg tablet 1 tablet, oral, Every 6 hours PRN    ibuprofen 800 mg, oral, Every 8 hours PRN    levothyroxine (SYNTHROID, LEVOXYL) 150 mcg, oral, Daily RT    lisinopril 20 mg, oral, Daily RT    metFORMIN (Glucophage) 1,000 mg tablet 1 tablet, oral, 2 times daily with meals    omeprazole (PRILOSEC) 20 mg, oral, Daily RT    predniSONE (DELTASONE) 5 mg, oral, Daily RT, Begin day after completing 20 mg burst    predniSONE (DELTASONE) 20 mg, oral, Daily        Past Medical History:   Diagnosis Date    Diabetes mellitus (Multi)     Hypothyroidism         Past Surgical History:   Procedure Laterality Date    CERVICAL  SPINE SURGERY      LUMBAR FUSION      ORIF ANKLE FRACTURE      ORIF FIBULA FRACTURE          No family history on file.     Allergies   Allergen Reactions    Penicillins Anaphylaxis    Codeine Nausea/vomiting        Objective     There were no vitals filed for this visit.     Physical Exam    GENERAL EXAM  Vital Signs: Vital signs to include heart rate, respiration rate, blood pressure, and temperature were reviewed.  General Appearance:  Awake, alert, healthy appearing, well developed, No acute distress.  Head: Normocephalic without evidence of head injury.  Neck: The appearance of the neck was normal without swelling with a midline trachea.  Eyes: The eyelids and eyebrows exhibited no abnormalities.  Pupils were not pin-point.  Sclera was without icterus.  Lungs: Respiration rhythm and depth was normal.  Respiratory movements were normal without labored breathing.  Cardiovascular: No peripheral edema was present.    Neurological: Patient was oriented to time, place, and person.  Speech was normal.  Balance, gait, and stance were unremarkable.    Psychiatric: Appearance was normal with appropriate dress.  Mood was euthymic and affect was normal.  Skin: Affected regions were without ecchymosis or skin lesions.    Back (MSK/neuro/skin):  Full active and passive range of motion in all planes  Strength 5 out of 5 bilateral lower extremities  Sensation to light-touch grossly intact bilateral lower extremities  Deep tendon reflexes equal bilateral lower extremities  No edema/effusion/erythema  Skin: no skin lesions or scars  B SLR (-)  B sacral spring test (-),   B facet load (-)  B SIJ tenderness (-)   B CAROLYN (-)  Full flexion/extension  No TTP, no muscle spasm over lumbar paraspinals    Physical exam as above except:  Strength 4-5 left foot dorsiflexion.  Some tenderness over left SI joint and piriformis as well.  Ambulates with rolling walker.      Assessment/Plan   Diagnoses and all orders for this  visit:  Postlaminectomy syndrome  -     MR lumbar spine w and wo IV contrast; Future  -     Basic metabolic panel; Future  Coccydynia    58-year-old female with excellent relief from ganglion impar block for her coccydynia.  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  She does have some weakness on exam so I think it is important we get some new imaging in form of an MRI with and without contrast before doing anything else.  Our plan for today:  - MRI of her lumbar spine with and without contrast.  - Order BMP as she does not have a recent creatinine to check her kidney function prior to the MRI.  - She will follow-up with after the MRI and we will go over the results and, come up with a game plan from there.       This note was generated with the aid of dictation software, there may be typos despite my attempts at proofreading.

## 2024-04-17 PROCEDURE — RXMED WILLOW AMBULATORY MEDICATION CHARGE

## 2024-04-19 PROCEDURE — RXMED WILLOW AMBULATORY MEDICATION CHARGE

## 2024-04-23 ENCOUNTER — PHARMACY VISIT (OUTPATIENT)
Dept: PHARMACY | Facility: CLINIC | Age: 59
End: 2024-04-23
Payer: COMMERCIAL

## 2024-04-25 ENCOUNTER — APPOINTMENT (OUTPATIENT)
Dept: PAIN MEDICINE | Facility: CLINIC | Age: 59
End: 2024-04-25
Payer: COMMERCIAL

## 2024-04-29 ENCOUNTER — LAB (OUTPATIENT)
Dept: LAB | Facility: LAB | Age: 59
End: 2024-04-29
Payer: COMMERCIAL

## 2024-04-29 DIAGNOSIS — M96.1 POSTLAMINECTOMY SYNDROME: ICD-10-CM

## 2024-04-29 LAB
ANION GAP SERPL CALC-SCNC: 21 MMOL/L (ref 10–20)
BUN SERPL-MCNC: 33 MG/DL (ref 6–23)
CALCIUM SERPL-MCNC: 9.3 MG/DL (ref 8.6–10.6)
CHLORIDE SERPL-SCNC: 94 MMOL/L (ref 98–107)
CO2 SERPL-SCNC: 26 MMOL/L (ref 21–32)
CREAT SERPL-MCNC: 2.19 MG/DL (ref 0.5–1.05)
EGFRCR SERPLBLD CKD-EPI 2021: 25 ML/MIN/1.73M*2
GLUCOSE SERPL-MCNC: 380 MG/DL (ref 74–99)
POTASSIUM SERPL-SCNC: 4.6 MMOL/L (ref 3.5–5.3)
SODIUM SERPL-SCNC: 136 MMOL/L (ref 136–145)

## 2024-04-29 PROCEDURE — 36415 COLL VENOUS BLD VENIPUNCTURE: CPT

## 2024-04-29 PROCEDURE — 80048 BASIC METABOLIC PNL TOTAL CA: CPT

## 2024-04-30 ENCOUNTER — TELEPHONE (OUTPATIENT)
Dept: PAIN MEDICINE | Facility: CLINIC | Age: 59
End: 2024-04-30
Payer: COMMERCIAL

## 2024-04-30 ENCOUNTER — APPOINTMENT (OUTPATIENT)
Dept: RADIOLOGY | Facility: CLINIC | Age: 59
End: 2024-04-30
Payer: COMMERCIAL

## 2024-04-30 ENCOUNTER — HOSPITAL ENCOUNTER (OUTPATIENT)
Dept: RADIOLOGY | Facility: CLINIC | Age: 59
Discharge: HOME | End: 2024-04-30
Payer: COMMERCIAL

## 2024-04-30 DIAGNOSIS — M96.1 POSTLAMINECTOMY SYNDROME: ICD-10-CM

## 2024-04-30 DIAGNOSIS — M96.1 POSTLAMINECTOMY SYNDROME: Primary | ICD-10-CM

## 2024-04-30 PROCEDURE — 72148 MRI LUMBAR SPINE W/O DYE: CPT

## 2024-04-30 PROCEDURE — 72148 MRI LUMBAR SPINE W/O DYE: CPT | Performed by: RADIOLOGY

## 2024-04-30 NOTE — TELEPHONE ENCOUNTER
Spoke with patient on the phone. Her kidney lab values are abnormal. Patient is aware and will contact her PCP.

## 2024-05-01 DIAGNOSIS — M96.1 POSTLAMINECTOMY SYNDROME: Primary | ICD-10-CM

## 2024-05-07 ENCOUNTER — APPOINTMENT (OUTPATIENT)
Dept: PAIN MEDICINE | Facility: CLINIC | Age: 59
End: 2024-05-07
Payer: COMMERCIAL

## 2024-05-17 ENCOUNTER — HOSPITAL ENCOUNTER (OUTPATIENT)
Dept: RADIOLOGY | Facility: CLINIC | Age: 59
Discharge: HOME | End: 2024-05-17
Payer: COMMERCIAL

## 2024-05-17 ENCOUNTER — HOSPITAL ENCOUNTER (OUTPATIENT)
Dept: OPERATING ROOM | Facility: CLINIC | Age: 59
Discharge: HOME | End: 2024-05-17
Payer: COMMERCIAL

## 2024-05-17 VITALS
DIASTOLIC BLOOD PRESSURE: 54 MMHG | TEMPERATURE: 97.3 F | OXYGEN SATURATION: 96 % | WEIGHT: 200 LBS | HEART RATE: 82 BPM | SYSTOLIC BLOOD PRESSURE: 116 MMHG | RESPIRATION RATE: 16 BRPM | BODY MASS INDEX: 39.27 KG/M2 | HEIGHT: 60 IN

## 2024-05-17 DIAGNOSIS — M96.1 POSTLAMINECTOMY SYNDROME: ICD-10-CM

## 2024-05-17 PROCEDURE — 2500000004 HC RX 250 GENERAL PHARMACY W/ HCPCS (ALT 636 FOR OP/ED): Performed by: PHYSICAL MEDICINE & REHABILITATION

## 2024-05-17 PROCEDURE — 2500000006 HC RX 250 W HCPCS SELF ADMINISTERED DRUGS (ALT 637 FOR ALL PAYERS): Performed by: PHYSICAL MEDICINE & REHABILITATION

## 2024-05-17 PROCEDURE — 96372 THER/PROPH/DIAG INJ SC/IM: CPT | Performed by: PHYSICAL MEDICINE & REHABILITATION

## 2024-05-17 PROCEDURE — 62323 NJX INTERLAMINAR LMBR/SAC: CPT | Performed by: PHYSICAL MEDICINE & REHABILITATION

## 2024-05-17 PROCEDURE — 2550000001 HC RX 255 CONTRASTS: Performed by: PHYSICAL MEDICINE & REHABILITATION

## 2024-05-17 PROCEDURE — 2500000005 HC RX 250 GENERAL PHARMACY W/O HCPCS: Performed by: PHYSICAL MEDICINE & REHABILITATION

## 2024-05-17 RX ORDER — DIAZEPAM 5 MG/1
5 TABLET ORAL ONCE
Status: COMPLETED | OUTPATIENT
Start: 2024-05-17 | End: 2024-05-17

## 2024-05-17 RX ORDER — LIDOCAINE HYDROCHLORIDE 5 MG/ML
INJECTION, SOLUTION INFILTRATION; PERINEURAL AS NEEDED
Status: COMPLETED | OUTPATIENT
Start: 2024-05-17 | End: 2024-05-17

## 2024-05-17 RX ORDER — METHYLPREDNISOLONE ACETATE 40 MG/ML
INJECTION, SUSPENSION INTRA-ARTICULAR; INTRALESIONAL; INTRAMUSCULAR; SOFT TISSUE AS NEEDED
Status: COMPLETED | OUTPATIENT
Start: 2024-05-17 | End: 2024-05-17

## 2024-05-17 RX ADMIN — METHYLPREDNISOLONE ACETATE 40 MG: 40 INJECTION, SUSPENSION INTRA-ARTICULAR; INTRALESIONAL; INTRAMUSCULAR; SOFT TISSUE at 10:11

## 2024-05-17 RX ADMIN — IOHEXOL 3 ML: 240 INJECTION, SOLUTION INTRATHECAL; INTRAVASCULAR; INTRAVENOUS; ORAL at 10:09

## 2024-05-17 RX ADMIN — DIAZEPAM 5 MG: 5 TABLET ORAL at 09:30

## 2024-05-17 RX ADMIN — LIDOCAINE HYDROCHLORIDE 10 ML: 5 INJECTION, SOLUTION INFILTRATION; PERINEURAL at 10:08

## 2024-05-17 ASSESSMENT — PAIN - FUNCTIONAL ASSESSMENT
PAIN_FUNCTIONAL_ASSESSMENT: 0-10
PAIN_FUNCTIONAL_ASSESSMENT: 0-10

## 2024-05-17 ASSESSMENT — PAIN SCALES - GENERAL
PAINLEVEL_OUTOF10: 0 - NO PAIN
PAINLEVEL_OUTOF10: 0 - NO PAIN

## 2024-05-17 NOTE — PROCEDURES
General    Date/Time: 5/17/2024 10:15 AM    Performed by: Levi Mcclellan MD  Authorized by: Levi Mcclellan MD    Consent:     Consent obtained:  Written    Consent given by:  Patient    Risks, benefits, and alternatives were discussed: yes      Risks discussed:  Bleeding, infection, pain and nerve damage    Alternatives discussed:  No treatment, delayed treatment and alternative treatment  Universal protocol:     Procedure explained and questions answered to patient or proxy's satisfaction: yes      Relevant documents present and verified: yes      Test results available: yes      Imaging studies available: yes      Required blood products, implants, devices, and special equipment available: yes      Site/side marked: yes      Immediately prior to procedure, a time out was called: yes      Patient identity confirmed:  Verbally with patient, hospital-assigned identification number and arm band  Procedure specific details:      Caudal ANTONELLA    The patient was positioned comfortably into the supine position and was prepped and draped in the usual sterile manner.  Sterile precautions, including sterile gloves, disposable cap and masks were worn for the procedure at all times. Skeletal landmarks were identified under fluoroscopy. There was no evidence of infection at the site(s) of needle insertion.  A final time-out was done.  The skin and subcutaneous tissue at insertion site was anesthetized with 1% lidocaine with or without sodium bicarbonate.  Under fluoroscopic guidance, the needle listed below was placed into the epidural space through the caudal approach.  If an Epimed Catheter was used it was advanced to the below level.  Radio-opaque contrast was utilized to confirm position.  Appropriate epidural spread was observed without vascular uptake or spread suggestive of an intrathecal injection.   Blood was not aspirated.  CSF was not aspirated.  Paresthesias were not elicited.  A therapeutic solution as indicated  below was injected.  If used, the catheter was flushed and the catheter and needle were removed as a unit with tip of the catheter noted to be intact.  Pressure was held over the site until hemostasis was achieved, and after ensuring hemostasis and the absence of hematoma, an adhesive bandage was applied to the site of needle insertion.  Preservative-free solutions were utilized in the epidural space.      Needle type and catheter: [22 gauge Quincke Spinal]    Level catheter advanced if used: [N/A]  Medications [9 mL of 0.5% lidocaine and 40 mg methylprednisolone]

## 2024-05-17 NOTE — DISCHARGE INSTRUCTIONS
Discharge Instructions for Anesthesiology Pain Service Patients - Dr. Mcclellan    Observe the needle site for excessive bleeding (slow general oozing that completely soaks the dressing or fresh bright red bleeding).  In either case, apply pressure to the area, elevate it if possible and call your doctor at once.    Observe/monitor for the following signs and symptoms:         Needle site:  change in color, numbness or tingling, coldness to touch, swelling, drainage       Temperature of 101.5 or higher       Increased or uncontrollable pain    If you notice any of the above signs or symptoms, please call your doctor at once.    Your pain may not be gone immediately after this procedure; it generally takes 3 to 5 days for the steroid to work.    Keep the needle site clean and dry for 24 hours.    Continue your present medications.    No driving or operating machinery for 24 hours if you have received sedation.    Make an appointment to see you doctor in 2-3 weeks    Central Scheduling Number:  [phone]  Dr. Mcclellan's office:  [phone]  Dr. Mcclellan's line:  [phone]  After hours Pain Management:  [phone].    If any problems occur, or if you have further questions, please call you doctor as soon as possible.  If you find that you cannot reach your doctor, but feel that your condition needs a doctors attention, go to the  emergency room nearest your home.

## 2024-05-17 NOTE — H&P
Pain Management H&P    History Of Present Illness  Ashley Martinez is a 58 y.o. female presents for procedure state below. Endorses no changes in past medical history or medical health since last seen in clinic.      Past Medical History  She has a past medical history of Diabetes mellitus (Multi) and Hypothyroidism.    Surgical History  She has a past surgical history that includes Lumbar fusion; ORIF ankle fracture; ORIF fibula fracture; and Cervical spine surgery.     Social History  She reports that she quit smoking about 5 years ago. Her smoking use included cigarettes. She has never used smokeless tobacco. Alcohol use questions deferred to the physician. Drug use questions deferred to the physician.    Family History  No family history on file.     Allergies  Penicillins and Codeine    Review of Symptoms:   Constitutional: Negative for chills, diaphoresis or fever  HENT: Negative for neck swelling  Eyes:.  Negative for eye pain  Respiratory:.  Negative for cough, shortness of breath or wheezing    Cardiovascular:.  Negative for chest pain or palpitations  Gastrointestinal:.  Negative for abdominal pain, nausea and vomiting  Genitourinary:.  Negative for urgency  Musculoskeletal:  Positive for back pain. Positive for joint pain. Denies falls within the past 3 months.  Skin: Negative for wounds or itching   Neurological: Negative for dizziness, seizures, loss of consciousness and weakness  Endo/Heme/Allergies: Does not bruise/bleed easily  Psychiatric/Behavioral: Negative for depression. The patient does not appear anxious.       PHYSICAL EXAM  Vitals signs reviewed  Constitutional:       General: Not in acute distress     Appearance: Normal appearance. Not ill-appearing.  HENT:     Head: Normocephalic and atraumatic  Eyes:     Conjunctiva/sclera: Conjunctivae normal  Cardiovascular:     Rate and Rhythm: Normal rate and regular rhythm  Pulmonary:     Effort: No respiratory distress  Abdominal:     Palpations:  Abdomen is soft  Musculoskeletal: CASTELLANO  Skin:     General: Skin is warm and dry  Neurological:     General: No focal deficit present  Psychiatric:         Mood and Affect: Mood normal         Behavior: Behavior normal     Last Recorded Vitals  There were no vitals taken for this visit.    Relevant Results  Current Outpatient Medications   Medication Instructions    amphetamine-dextroamphetamine (Adderall) 15 mg tablet 15 mg, oral, 2 times daily    cyclobenzaprine (FLEXERIL) 10 mg, oral, 3 times daily PRN    difluprednate (Durezol) 0.05 % ophthalmic solution 1 drop, ophthalmic (eye), 4 times daily    dulaglutide (TRULICITY) 4.5 mg/0.5 mL pen injector INJECT [4.5] MG UNDER THE SKIN ONCE WEEKLY    estradiol (ESTRACE) 1 g, vaginal    folic acid (FOLVITE) 1 mg, oral, Daily RT    furosemide (LASIX) 40 mg, oral, Daily RT    gabapentin (NEURONTIN) 800 mg, oral, 3 times daily    HYDROcodone-acetaminophen (Norco) 5-325 mg tablet 1 tablet, oral, Every 6 hours PRN    ibuprofen 800 mg, oral, Every 8 hours PRN    levothyroxine (SYNTHROID, LEVOXYL) 150 mcg, oral, Daily RT    lisinopril 20 mg, oral, Daily RT    metFORMIN (Glucophage) 1,000 mg tablet 1 tablet, oral, 2 times daily (morning and late afternoon)    metFORMIN (Glucophage) 1,000 mg tablet Take 1 tablet by mouth twice daily with meals.    omeprazole (PRILOSEC) 20 mg, oral, Daily RT    predniSONE (DELTASONE) 5 mg, oral, Daily RT, Begin day after completing 20 mg burst    predniSONE (DELTASONE) 20 mg, oral, Daily    semaglutide (Ozempic) 2 mg/dose (8 mg/3 mL) pen injector Inject 2 mg under the skin one time a week.         MR lumbar spine wo IV contrast 04/30/2024    Narrative  Interpreted By:  Monica Bateman,  STUDY:  MRI of the lumbar spine without IV contrast;  4/30/2024 10:06 am    INDICATION:  Signs/Symptoms:Low back pain with left lower extremity radicular  symptoms and weakness with prior L4-S1 fusion.    COMPARISON:  07/19/2019.    ACCESSION  NUMBER(S):  [id number]    ORDERING CLINICIAN:  ABIMBOLA MADERA    TECHNIQUE:  Sagittal and axial STIR and T1-weighted MRI images of the lumbar  spine were acquired using a spondylolysis protocol.  No contrast was  administered.    FINDINGS:  The examination is degraded by patient motion artifact.    For counting purposes the last lumbarized vertebral body is labeled  L5.    There are postsurgical changes of posterolateral fusion, discectomy  and decompression at L4, L5 and S1, similar to the prior exam.    There is grade 1 anterolisthesis of L5 on S1. Alignment is otherwise  maintained. There is a nonacute compression deformity of the T12  vertebral body with 75 % loss of vertebral body height anteriorly and  no osseous retropulsion into the spinal canal. Vertebral body heights  are otherwise maintained. Bone marrow signal pattern is within normal  limits.    There is desiccated disc signal throughout the lower thoracic spine  and lumbar spine. The conus terminates at L1-L2 and is unremarkable.    Prevertebral soft tissues are not thickened.    Evaluation by level:    T11-T12: Disc bulge and facet arthrosis. Mild spinal canal and neural  foraminal stenosis.    T12-L1: No spinal canal or neural foraminal stenosis    L1-L2: No spinal canal or neural foraminal stenosis.    L2-L3: Mild disc bulge and facet arthrosis. No spinal canal stenosis.  No neural foraminal stenosis.    L3-L4: There is a 0.6 cm T2 hyperintense structure medial to the  right facet joint which may represent a synovial cyst. There is disc  bulge and facet arthrosis resulting in severe spinal canal stenosis.  Moderate neural foraminal stenosis    L4-L5: Status post decompression. No spinal canal or neural foraminal  stenosis.    L5-S1: Status post decompression.. Disc bulge and facet arthrosis. No  spinal canal stenosis. Severe bilateral neural foraminal stenosis.    Impression  Postsurgical changes of posterolateral fusion, decompression  and  discectomy at L4, L5 and S1.    Degenerative changes including a synovial cyst measuring 0.6 cm at  L3-L4 resulting in severe spinal canal and moderate bilateral neural  foraminal stenosis. Degenerative changes have progressed from the  prior exam 07/19/2019.    I personally reviewed the images/study and I agree with the findings  as stated. This study was interpreted at [place], [place].    MACRO:  None    Signed by: Monica Bateman 4/30/2024 1:51 PM  Dictation workstation:   [id number]     No image results found.       No diagnosis found.     ASSESSMENT/PLAN  Ashley Martinez is a 58 y.o. female presents for caudal ANTONELLA    Patient denies any recent antibiotic use or infections, denies any blood thinner use, and denies contrast or local anesthetic allergies     Risks, benefits, alternatives discussed. All questions answered to the best of my ability. Patient agrees to proceed.      Our plan is as follows:  - Proceed with aforementioned procedure          Nallely Diaz DO   Pain fellow

## 2024-05-20 NOTE — ADDENDUM NOTE
Encounter addended by: Stephie Pereira RN on: 5/20/2024 9:37 AM   Actions taken: Contacts section saved, Flowsheet accepted

## 2024-06-10 PROCEDURE — RXMED WILLOW AMBULATORY MEDICATION CHARGE

## 2024-06-11 ENCOUNTER — PHARMACY VISIT (OUTPATIENT)
Dept: PHARMACY | Facility: CLINIC | Age: 59
End: 2024-06-11
Payer: COMMERCIAL

## 2024-06-12 ENCOUNTER — TELEPHONE (OUTPATIENT)
Dept: PHARMACY | Facility: HOSPITAL | Age: 59
End: 2024-06-12
Payer: COMMERCIAL

## 2024-06-12 NOTE — TELEPHONE ENCOUNTER
"[facility]    Ashley Martinez is a 58 y.o. female was contacted by Clinical Pharmacy Team to complete a comprehensive medication review (CMR) with a pharmacist as part of the [employer] diabetes program.      Allergies   Allergen Reactions    Penicillins Anaphylaxis    Codeine Nausea/vomiting       Patient uses  [facility]     LAB  Lab Results   Component Value Date    CALCIUM 9.3 04/29/2024    CO2 26 04/29/2024    CL 94 (L) 04/29/2024    CREATININE 2.19 (H) 04/29/2024    GLUCOSE 380 (H) 04/29/2024    K 4.6 04/29/2024     04/29/2024    BUN 33 (H) 04/29/2024    ANIONGAP 21 (H) 04/29/2024     No results found for: \"TRIG\", \"CHOL\", \"LDLCALC\", \"HDL\"  Lab Results   Component Value Date    HGBA1C 7.5 (H) 01/04/2023       Current Outpatient Medications on File Prior to Visit   Medication Sig Dispense Refill    amphetamine-dextroamphetamine (Adderall) 15 mg tablet Take 1 tablet (15 mg) by mouth twice a day.      cyclobenzaprine (Flexeril) 10 mg tablet Take 1 tablet (10 mg) by mouth 3 times a day as needed for muscle spasms. 15 tablet 0    difluprednate (Durezol) 0.05 % ophthalmic solution Administer 1 drop into affected eye(s) 4 times a day.      dulaglutide (TRULICITY) 4.5 mg/0.5 mL pen injector INJECT [4.5] MG UNDER THE SKIN ONCE WEEKLY 6 mL 3    estradiol (Estrace) 0.01 % (0.1 mg/gram) vaginal cream Insert 0.25 Applicatorfuls (1 g) into the vagina.      folic acid (Folvite) 1 mg tablet Take 1 tablet (1 mg) by mouth once daily.      furosemide (Lasix) 40 mg tablet Take 1 tablet (40 mg) by mouth once daily.      gabapentin (Neurontin) 800 mg tablet Take 1 tablet (800 mg) by mouth 3 times a day.      HYDROcodone-acetaminophen (Norco) 5-325 mg tablet Take 1 tablet by mouth every 6 hours if needed for severe pain (7 - 10). 10 tablet 0    ibuprofen 800 mg tablet Take 1 tablet (800 mg) by mouth every 8 hours if needed.      levothyroxine (Synthroid, " Levoxyl) 150 mcg tablet Take 1 tablet (150 mcg) by mouth once daily. 30 tablet 0    lisinopril 20 mg tablet Take 1 tablet (20 mg) by mouth once daily.      metFORMIN (Glucophage) 1,000 mg tablet Take 1 tablet (1,000 mg) by mouth 2 times a day with meals.      metFORMIN (Glucophage) 1,000 mg tablet Take 1 tablet by mouth twice daily with meals. 180 tablet 1    omeprazole (PriLOSEC) 20 mg DR capsule Take 1 capsule (20 mg) by mouth once daily.      predniSONE (Deltasone) 20 mg tablet Take 1 tablet (20 mg) by mouth once daily. 4 tablet 0    predniSONE (Deltasone) 5 mg tablet Take 1 tablet (5 mg) by mouth once daily. Begin day after completing 20 mg burst 30 tablet 0    semaglutide (Ozempic) 2 mg/dose (8 mg/3 mL) pen injector Inject 2 mg under the skin one time a week. 9 mL 1     No current facility-administered medications on file prior to visit.      CURRENT DIABETES PHARMACOTHERAPY  - Metformin 1000 mg BID  - Ozempic 2 mg once weekly (switched from Trulicity)    SECONDARY PREVENTION  - Statin? no  - ACE-I/ARB? yes    ASSESSMENT/PLAN:  - Patient enrolled in  Employee diabetes program for $0 co-pays on diabetes medications/supplies. Enrollment should be active in 2-4 weeks   - Requested [facility] enrollment date: 6/12/24  - PharmD Management Level: 1  -  Pharmacy fill location: [facility]     Problem List Items Addressed This Visit    None       Follow up: 10 months     Continue all meds under the continuation of care with the referring provider and clinical pharmacy team.    Eda Welch, PharmD     Patient/Caregiver was informed they may decline to participate or withdraw from participation in pharmacy services at any time.

## 2024-06-28 ENCOUNTER — PHARMACY VISIT (OUTPATIENT)
Dept: PHARMACY | Facility: CLINIC | Age: 59
End: 2024-06-28
Payer: COMMERCIAL

## 2024-06-28 PROCEDURE — RXMED WILLOW AMBULATORY MEDICATION CHARGE

## 2024-06-28 RX ORDER — ATORVASTATIN CALCIUM 20 MG/1
20 TABLET, FILM COATED ORAL DAILY
Qty: 90 TABLET | Refills: 1 | OUTPATIENT
Start: 2024-06-28 | End: 2024-06-29

## 2024-06-28 RX ORDER — EMPAGLIFLOZIN 25 MG/1
25 TABLET, FILM COATED ORAL
Qty: 90 TABLET | Refills: 1 | OUTPATIENT
Start: 2024-06-28

## 2024-07-01 PROCEDURE — RXMED WILLOW AMBULATORY MEDICATION CHARGE

## 2024-07-10 ENCOUNTER — PHARMACY VISIT (OUTPATIENT)
Dept: PHARMACY | Facility: CLINIC | Age: 59
End: 2024-07-10
Payer: COMMERCIAL

## 2024-07-10 PROCEDURE — RXMED WILLOW AMBULATORY MEDICATION CHARGE

## 2024-07-26 ENCOUNTER — PREP FOR PROCEDURE (OUTPATIENT)
Dept: PAIN MEDICINE | Facility: HOSPITAL | Age: 59
End: 2024-07-26
Payer: COMMERCIAL

## 2024-07-26 DIAGNOSIS — M96.1 POSTLAMINECTOMY SYNDROME: Primary | ICD-10-CM

## 2024-07-26 RX ORDER — DIAZEPAM 5 MG/1
5 TABLET ORAL ONCE AS NEEDED
OUTPATIENT
Start: 2024-07-26

## 2024-07-31 PROCEDURE — RXMED WILLOW AMBULATORY MEDICATION CHARGE

## 2024-08-06 NOTE — H&P
HISTORY AND PHYSICAL    History Of Present Illness  Ashley Martinez is a 58 y.o. female presenting with chronic pain.  Here for Caudal epidural steroid injection    she denies any recent antibiotic use or infections, she denies any blood thinner use , and she denies contrast or local anesthetic allergies     Past Medical History  Past Medical History:   Diagnosis Date    Diabetes mellitus (Multi)     Hypothyroidism        Surgical History  Past Surgical History:   Procedure Laterality Date    CERVICAL SPINE SURGERY      LUMBAR FUSION      ORIF ANKLE FRACTURE      ORIF FIBULA FRACTURE          Social History  She reports that she quit smoking about 5 years ago. Her smoking use included cigarettes. She has never used smokeless tobacco. Alcohol use questions deferred to the physician. Drug use questions deferred to the physician.    Family History  No family history on file.     Allergies  Penicillins and Codeine    Review of Systems   12 point ROS done and negative except for the above.   Physical Exam     General: NAD, well groomed, well nourished  Eyes: Non-icteric sclera, EOMI  Ears, Nose, Mouth, and Throat: External ears and nose appear to be without deformity or rash. No lesions or masses noted. Hearing is grossly intact.   Neck: Trachea midline  Respiratory: Nonlabored breathing   Cardiovascular: No peripheral edema   Skin: No rashes or open lesions/ulcers identified on skin.    Last Recorded Vitals  There were no vitals taken for this visit.    Relevant Results  Current Outpatient Medications   Medication Instructions    amphetamine-dextroamphetamine (Adderall) 15 mg tablet 15 mg, oral, 2 times daily    amphetamine-dextroamphetamine (Adderall) 20 mg tablet Take 1 tablet by mouth two times a day for 30 days.    cyclobenzaprine (FLEXERIL) 10 mg, oral, 3 times daily PRN    difluprednate (Durezol) 0.05 % ophthalmic solution 1 drop, ophthalmic (eye), 4 times daily    dulaglutide (TRULICITY) 4.5 mg/0.5 mL pen  "injector INJECT [4.5] MG UNDER THE SKIN ONCE WEEKLY    empagliflozin (Jardiance) 25 mg Take 1 tablet by mouth daily with breakfast.    empagliflozin (Jardiance) 25 mg Take 1 tablet by mouth daily with breakfast.    estradiol (ESTRACE) 1 g, vaginal    folic acid (FOLVITE) 1 mg, oral, Daily RT    furosemide (LASIX) 40 mg, oral, Daily RT    gabapentin (NEURONTIN) 800 mg, oral, 3 times daily    HYDROcodone-acetaminophen (Norco) 5-325 mg tablet 1 tablet, oral, Every 6 hours PRN    ibuprofen 800 mg, oral, Every 8 hours PRN    levothyroxine (SYNTHROID, LEVOXYL) 150 mcg, oral, Daily RT    lisinopril 20 mg, oral, Daily RT    metFORMIN (Glucophage) 1,000 mg tablet 1 tablet, oral, 2 times daily (morning and late afternoon)    metFORMIN (Glucophage) 1,000 mg tablet Take 1 tablet by mouth twice daily with meals.    metFORMIN (Glucophage) 1,000 mg tablet Take 1 tablet by mouth two times a day with meals.    omeprazole (PRILOSEC) 20 mg, oral, Daily RT    predniSONE (DELTASONE) 5 mg, oral, Daily RT, Begin day after completing 20 mg burst    predniSONE (DELTASONE) 20 mg, oral, Daily    respiratory syncytial virus, RSV,, eligible pregnant pts (Abrysvo, PF,) 120 mcg/0.5 mL injection Inject 0.5 mL intramuscularly one time only for 1 dose.    rosuvastatin (Crestor) 20 mg tablet Take 1 tablet by mouth daily at bedtime.    semaglutide (Ozempic) 2 mg/dose (8 mg/3 mL) pen injector Inject 2 mg under the skin one time a week.    semaglutide (Ozempic) 2 mg/dose (8 mg/3 mL) pen injector Inject 2 mg subcutaneously one time a week.      No results found for: \"WBC\", \"HGB\", \"HCT\", \"MCV\", \"PLT\"   No results found for: \"INR\", \"PROTIME\"  No results found for: \"PTT\"  Lab Results   Component Value Date    GLUCOSE 380 (H) 04/29/2024    CALCIUM 9.3 04/29/2024     04/29/2024    K 4.6 04/29/2024    CO2 26 04/29/2024    CL 94 (L) 04/29/2024    BUN 33 (H) 04/29/2024    CREATININE 2.19 (H) 04/29/2024            Assessment/Plan   Ashley Martinez is a 58 " carmne MARTIN who presents for Caudal epidural steroid injection.     Risks, benefits, alternatives discussed. All questions answered to the best of my ability. Patient agrees to proceed. Consent signed and patient marked appropriately.    -We will proceed with planned procedure        Pastor Judge MD  Interventional Pain Fellow, PGY-5  [facility]

## 2024-08-07 ENCOUNTER — HOSPITAL ENCOUNTER (OUTPATIENT)
Dept: OPERATING ROOM | Facility: CLINIC | Age: 59
Setting detail: OUTPATIENT SURGERY
Discharge: HOME | End: 2024-08-07
Payer: COMMERCIAL

## 2024-08-07 VITALS
SYSTOLIC BLOOD PRESSURE: 126 MMHG | HEIGHT: 60 IN | BODY MASS INDEX: 38.74 KG/M2 | HEART RATE: 80 BPM | RESPIRATION RATE: 16 BRPM | WEIGHT: 197.31 LBS | OXYGEN SATURATION: 98 % | DIASTOLIC BLOOD PRESSURE: 77 MMHG | TEMPERATURE: 97.9 F

## 2024-08-07 DIAGNOSIS — M96.1 POSTLAMINECTOMY SYNDROME: ICD-10-CM

## 2024-08-07 PROCEDURE — 7100000009 HC PHASE TWO TIME - INITIAL BASE CHARGE

## 2024-08-07 PROCEDURE — 2500000004 HC RX 250 GENERAL PHARMACY W/ HCPCS (ALT 636 FOR OP/ED): Performed by: PHYSICAL MEDICINE & REHABILITATION

## 2024-08-07 PROCEDURE — 3600000006 HC OR TIME - EACH INCREMENTAL 1 MINUTE - PROCEDURE LEVEL ONE

## 2024-08-07 PROCEDURE — 2500000002 HC RX 250 W HCPCS SELF ADMINISTERED DRUGS (ALT 637 FOR MEDICARE OP, ALT 636 FOR OP/ED): Performed by: PHYSICAL MEDICINE & REHABILITATION

## 2024-08-07 PROCEDURE — 62323 NJX INTERLAMINAR LMBR/SAC: CPT | Performed by: PHYSICAL MEDICINE & REHABILITATION

## 2024-08-07 PROCEDURE — 3600000001 HC OR TIME - INITIAL BASE CHARGE - PROCEDURE LEVEL ONE

## 2024-08-07 PROCEDURE — 2550000001 HC RX 255 CONTRASTS: Performed by: PHYSICAL MEDICINE & REHABILITATION

## 2024-08-07 PROCEDURE — 2500000005 HC RX 250 GENERAL PHARMACY W/O HCPCS: Performed by: PHYSICAL MEDICINE & REHABILITATION

## 2024-08-07 PROCEDURE — 7100000010 HC PHASE TWO TIME - EACH INCREMENTAL 1 MINUTE

## 2024-08-07 RX ORDER — LIDOCAINE HYDROCHLORIDE 5 MG/ML
INJECTION, SOLUTION INFILTRATION; INTRAVENOUS AS NEEDED
Status: COMPLETED | OUTPATIENT
Start: 2024-08-07 | End: 2024-08-07

## 2024-08-07 RX ORDER — DIAZEPAM 5 MG/1
5 TABLET ORAL ONCE AS NEEDED
Status: COMPLETED | OUTPATIENT
Start: 2024-08-07 | End: 2024-08-07

## 2024-08-07 RX ORDER — METHYLPREDNISOLONE ACETATE 40 MG/ML
INJECTION, SUSPENSION INTRA-ARTICULAR; INTRALESIONAL; INTRAMUSCULAR; SOFT TISSUE AS NEEDED
Status: COMPLETED | OUTPATIENT
Start: 2024-08-07 | End: 2024-08-07

## 2024-08-07 ASSESSMENT — PAIN SCALES - GENERAL
PAINLEVEL_OUTOF10: 7
PAINLEVEL_OUTOF10: 7
PAINLEVEL_OUTOF10: 8

## 2024-08-07 ASSESSMENT — PAIN - FUNCTIONAL ASSESSMENT
PAIN_FUNCTIONAL_ASSESSMENT: 0-10

## 2024-08-07 ASSESSMENT — COLUMBIA-SUICIDE SEVERITY RATING SCALE - C-SSRS
2. HAVE YOU ACTUALLY HAD ANY THOUGHTS OF KILLING YOURSELF?: NO
6. HAVE YOU EVER DONE ANYTHING, STARTED TO DO ANYTHING, OR PREPARED TO DO ANYTHING TO END YOUR LIFE?: NO
1. IN THE PAST MONTH, HAVE YOU WISHED YOU WERE DEAD OR WISHED YOU COULD GO TO SLEEP AND NOT WAKE UP?: NO

## 2024-08-07 ASSESSMENT — ENCOUNTER SYMPTOMS
OCCASIONAL FEELINGS OF UNSTEADINESS: 1
DEPRESSION: 0
LOSS OF SENSATION IN FEET: 0

## 2024-08-07 NOTE — DISCHARGE INSTRUCTIONS
Discharge Instructions for Anesthesiology Pain Service Patients - Dr. Mcclellan    Observe the needle site for excessive bleeding (slow general oozing that completely soaks the dressing or fresh bright red bleeding).  In either case, apply pressure to the area, elevate it if possible and call your doctor at once.    Observe/monitor for the following signs and symptoms:         Needle site:  change in color, numbness or tingling, coldness to touch, swelling, drainage       Temperature of 101.5 or higher       Increased or uncontrollable pain    If you notice any of the above signs or symptoms, please call your doctor at once.    Your pain may not be gone immediately after this procedure; it generally takes 3 to 5 days for the steroid to work.    Keep the needle site clean and dry for 24 hours.    Continue your present medications.    No driving or operating machinery for 24 hours if you have received sedation.    Make an appointment to see you doctor in 2-3 weeks    Central Scheduling Number:  [phone]  Dr. Mcclellan's office:  [phone]  Dr. Mcclellan's Nurse line:  [phone]  After hours Pain Management:  [phone].    If any problems occur, or if you have further questions, please call you doctor as soon as possible.  If you find that you cannot reach your doctor, but feel that your condition needs a doctors attention, go to the  emergency room nearest your home.

## 2024-08-08 ASSESSMENT — PAIN SCALES - GENERAL: PAINLEVEL_OUTOF10: 3

## 2024-08-08 NOTE — ADDENDUM NOTE
Encounter addended by: Dipti Lee RN on: 8/8/2024 9:08 AM   Actions taken: Clinical Note Signed, Flowsheet accepted

## 2024-08-15 PROCEDURE — RXMED WILLOW AMBULATORY MEDICATION CHARGE

## 2024-08-16 ENCOUNTER — PHARMACY VISIT (OUTPATIENT)
Dept: PHARMACY | Facility: CLINIC | Age: 59
End: 2024-08-16
Payer: COMMERCIAL

## 2024-10-09 ENCOUNTER — PHARMACY VISIT (OUTPATIENT)
Dept: PHARMACY | Facility: CLINIC | Age: 59
End: 2024-10-09
Payer: COMMERCIAL

## 2024-10-09 PROCEDURE — RXMED WILLOW AMBULATORY MEDICATION CHARGE

## 2024-11-22 ENCOUNTER — PHARMACY VISIT (OUTPATIENT)
Dept: PHARMACY | Facility: CLINIC | Age: 59
End: 2024-11-22
Payer: COMMERCIAL

## 2024-11-22 PROCEDURE — RXMED WILLOW AMBULATORY MEDICATION CHARGE

## 2024-12-11 PROCEDURE — RXMED WILLOW AMBULATORY MEDICATION CHARGE

## 2024-12-16 PROCEDURE — RXMED WILLOW AMBULATORY MEDICATION CHARGE

## 2024-12-20 PROCEDURE — RXMED WILLOW AMBULATORY MEDICATION CHARGE

## 2024-12-30 ENCOUNTER — PHARMACY VISIT (OUTPATIENT)
Dept: PHARMACY | Facility: CLINIC | Age: 59
End: 2024-12-30
Payer: COMMERCIAL

## 2025-01-20 PROCEDURE — RXMED WILLOW AMBULATORY MEDICATION CHARGE

## 2025-02-04 ENCOUNTER — PHARMACY VISIT (OUTPATIENT)
Dept: PHARMACY | Facility: CLINIC | Age: 60
End: 2025-02-04
Payer: COMMERCIAL

## 2025-02-25 PROCEDURE — RXMED WILLOW AMBULATORY MEDICATION CHARGE

## 2025-02-26 PROCEDURE — RXMED WILLOW AMBULATORY MEDICATION CHARGE

## 2025-02-28 ENCOUNTER — PHARMACY VISIT (OUTPATIENT)
Dept: PHARMACY | Facility: CLINIC | Age: 60
End: 2025-02-28
Payer: COMMERCIAL

## 2025-04-04 ENCOUNTER — PHARMACY VISIT (OUTPATIENT)
Dept: PHARMACY | Facility: CLINIC | Age: 60
End: 2025-04-04
Payer: COMMERCIAL

## 2025-04-04 PROCEDURE — RXMED WILLOW AMBULATORY MEDICATION CHARGE

## 2025-05-09 ENCOUNTER — PHARMACY VISIT (OUTPATIENT)
Dept: PHARMACY | Facility: CLINIC | Age: 60
End: 2025-05-09
Payer: COMMERCIAL

## 2025-05-09 PROCEDURE — RXMED WILLOW AMBULATORY MEDICATION CHARGE

## 2025-06-20 PROCEDURE — RXMED WILLOW AMBULATORY MEDICATION CHARGE

## 2025-06-21 PROCEDURE — RXMED WILLOW AMBULATORY MEDICATION CHARGE

## 2025-06-26 ENCOUNTER — DOCUMENTATION (OUTPATIENT)
Dept: PHARMACY | Facility: HOSPITAL | Age: 60
End: 2025-06-26
Payer: COMMERCIAL

## 2025-06-26 NOTE — PROGRESS NOTES
"[facility] ([facility])    Ashley Martinez \"Marlin\" is a 59 y.o. female was contacted by Clinical Pharmacy Team to complete a comprehensive medication review (CMR) with a pharmacist as part of the [employer] diabetes program.      Allergies   Allergen Reactions    Penicillins Anaphylaxis    Codeine Nausea/vomiting       Patient uses  [facility]     LAB  Lab Results   Component Value Date    CALCIUM 9.3 04/29/2024    CO2 26 04/29/2024    CL 94 (L) 04/29/2024    CREATININE 2.19 (H) 04/29/2024    GLUCOSE 380 (H) 04/29/2024    K 4.6 04/29/2024     04/29/2024    BUN 33 (H) 04/29/2024    ANIONGAP 21 (H) 04/29/2024     No results found for: \"TRIG\", \"CHOL\", \"LDLCALC\", \"HDL\"  Lab Results   Component Value Date    HGBA1C 11.6 (H) 06/27/2024       Current Outpatient Medications on File Prior to Visit   Medication Sig Dispense Refill    amphetamine-dextroamphetamine (Adderall) 15 mg tablet Take 1 tablet (15 mg) by mouth twice a day.      amphetamine-dextroamphetamine (Adderall) 20 mg tablet Take 1 tablet by mouth two times a day for 30 days. 60 tablet 0    cyclobenzaprine (Flexeril) 10 mg tablet Take 1 tablet (10 mg) by mouth 3 times a day as needed for muscle spasms. 15 tablet 0    difluprednate (Durezol) 0.05 % ophthalmic solution Administer 1 drop into affected eye(s) 4 times a day.      dulaglutide (TRULICITY) 4.5 mg/0.5 mL pen injector INJECT [4.5] MG UNDER THE SKIN ONCE WEEKLY 6 mL 3    empagliflozin (Jardiance) 25 mg Take 1 tablet by mouth daily with breakfast. 90 tablet 1    empagliflozin (Jardiance) 25 mg Take 1 tablet by mouth daily with breakfast. 90 tablet 1    empagliflozin (Jardiance) 25 mg tablet Take 1 tablet by mouth daily with breakfast. 90 tablet 1    estradiol (Estrace) 0.01 % (0.1 mg/gram) vaginal cream Insert 0.25 Applicatorfuls (1 g) into the vagina.      folic acid (Folvite) 1 mg tablet Take 1 tablet (1 mg) by mouth once daily.      " furosemide (Lasix) 40 mg tablet Take 1 tablet (40 mg) by mouth once daily.      gabapentin (Neurontin) 800 mg tablet Take 1 tablet (800 mg) by mouth 3 times a day.      HYDROcodone-acetaminophen (Norco) 5-325 mg tablet Take 1 tablet by mouth every 6 hours if needed for severe pain (7 - 10). 10 tablet 0    ibuprofen 800 mg tablet Take 1 tablet (800 mg) by mouth every 8 hours if needed.      levothyroxine (Synthroid, Levoxyl) 150 mcg tablet Take 1 tablet (150 mcg) by mouth once daily. 30 tablet 0    lisinopril 20 mg tablet Take 1 tablet (20 mg) by mouth once daily.      metFORMIN (Glucophage) 1,000 mg tablet Take 1 tablet (1,000 mg) by mouth 2 times daily (morning and late afternoon).      metFORMIN (Glucophage) 1,000 mg tablet Take 1 tablet by mouth twice daily with meals. 180 tablet 1    metFORMIN (Glucophage) 1,000 mg tablet Take 1 tablet by mouth two times a day with meals. 60 tablet 0    metFORMIN (Glucophage) 1,000 mg tablet Take 1 tablet by mouth two times a day with meals. 180 tablet 1    metFORMIN (Glucophage) 1,000 mg tablet Take 1 tablet by mouth two times a day with meals. 180 tablet 0    omeprazole (PriLOSEC) 20 mg DR capsule Take 1 capsule (20 mg) by mouth once daily.      predniSONE (Deltasone) 20 mg tablet Take 1 tablet (20 mg) by mouth once daily. 4 tablet 0    predniSONE (Deltasone) 5 mg tablet Take 1 tablet (5 mg) by mouth once daily. Begin day after completing 20 mg burst 30 tablet 0    respiratory syncytial virus, RSV,, eligible pregnant pts (Abrysvo, PF,) 120 mcg/0.5 mL injection Inject 0.5 mL intramuscularly one time only for 1 dose. 1 each 0    semaglutide (Ozempic) 2 mg/dose (8 mg/3 mL) pen injector Inject 2 mg under the skin one time a week. 9 mL 1    semaglutide (Ozempic) 2 mg/dose (8 mg/3 mL) pen injector Inject 2 mg subcutaneously one time a week. 9 mL 1    tirzepatide (Mounjaro) 15 mg/0.5 mL pen injector Inject 15 mg subcutaneously one time a week. 6 mL 1    [DISCONTINUED] atorvastatin  (Lipitor) 20 mg tablet Take 1 tablet by mouth once daily. 90 tablet 1    [DISCONTINUED] rosuvastatin (Crestor) 20 mg tablet Take 1 tablet by mouth daily at bedtime. 90 tablet 1     No current facility-administered medications on file prior to visit.      CURRENT DIABETES PHARMACOTHERAPY  - Metformin 1000 mg BID  - Mounjaro 15 mg weekly  - Jardiance 25 mg daily    SECONDARY PREVENTION  - Statin? no  - ACE-I/ARB? yes    ASSESSMENT/PLAN:  - Patient enrolled in  Employee diabetes program for $0 co-pays on diabetes medications/supplies. Enrollment should be active in 2-4 weeks   - Requested [facility] enrollment date: 4/15/25  - PharmD Management Level: 1  -  Pharmacy fill location:  [facility]     Follow up: 10-12 months     Continue all meds under the continuation of care with the referring provider and clinical pharmacy team.    Zaki Bales, PharmD     Patient/Caregiver was informed they may decline to participate or withdraw from participation in pharmacy services at any time.

## 2025-06-30 ENCOUNTER — PHARMACY VISIT (OUTPATIENT)
Dept: PHARMACY | Facility: CLINIC | Age: 60
End: 2025-06-30
Payer: COMMERCIAL

## 2025-07-31 PROCEDURE — RXMED WILLOW AMBULATORY MEDICATION CHARGE

## 2025-08-12 ENCOUNTER — PHARMACY VISIT (OUTPATIENT)
Dept: PHARMACY | Facility: CLINIC | Age: 60
End: 2025-08-12
Payer: COMMERCIAL